# Patient Record
Sex: MALE | Race: WHITE | NOT HISPANIC OR LATINO | Employment: OTHER | URBAN - METROPOLITAN AREA
[De-identification: names, ages, dates, MRNs, and addresses within clinical notes are randomized per-mention and may not be internally consistent; named-entity substitution may affect disease eponyms.]

---

## 2017-04-21 ENCOUNTER — TRANSCRIBE ORDERS (OUTPATIENT)
Dept: LAB | Facility: HOSPITAL | Age: 82
End: 2017-04-21

## 2017-10-20 ENCOUNTER — LAB REQUISITION (OUTPATIENT)
Dept: LAB | Facility: HOSPITAL | Age: 82
End: 2017-10-20

## 2017-10-20 DIAGNOSIS — Z00.00 ROUTINE GENERAL MEDICAL EXAMINATION AT A HEALTH CARE FACILITY: ICD-10-CM

## 2018-02-07 ENCOUNTER — HOSPITAL ENCOUNTER (OUTPATIENT)
Facility: HOSPITAL | Age: 83
Setting detail: OBSERVATION
Discharge: HOME | End: 2018-02-09
Attending: INTERNAL MEDICINE | Admitting: INTERNAL MEDICINE

## 2018-02-07 DIAGNOSIS — I48.0 PAF (PAROXYSMAL ATRIAL FIBRILLATION) (HCC): Primary | ICD-10-CM

## 2018-02-07 LAB — GLUCOSE BLDC GLUCOMTR-MCNC: 162 MG/DL (ref 70–130)

## 2018-02-07 PROCEDURE — A9270 NON-COVERED ITEM OR SERVICE: HCPCS | Performed by: NURSE PRACTITIONER

## 2018-02-07 PROCEDURE — 99024 POSTOP FOLLOW-UP VISIT: CPT | Performed by: INTERNAL MEDICINE

## 2018-02-07 PROCEDURE — 63710000001 APIXABAN 5 MG TABLET: Performed by: NURSE PRACTITIONER

## 2018-02-07 PROCEDURE — 93005 ELECTROCARDIOGRAM TRACING: CPT | Performed by: INTERNAL MEDICINE

## 2018-02-07 PROCEDURE — 93010 ELECTROCARDIOGRAM REPORT: CPT | Performed by: INTERNAL MEDICINE

## 2018-02-07 PROCEDURE — 63710000001 ATORVASTATIN 10 MG TABLET: Performed by: NURSE PRACTITIONER

## 2018-02-07 PROCEDURE — G0378 HOSPITAL OBSERVATION PER HR: HCPCS

## 2018-02-07 PROCEDURE — 63710000001 TAMSULOSIN 0.4 MG CAPSULE: Performed by: NURSE PRACTITIONER

## 2018-02-07 PROCEDURE — 63710000001 METOPROLOL TARTRATE 25 MG TABLET: Performed by: NURSE PRACTITIONER

## 2018-02-07 PROCEDURE — 82962 GLUCOSE BLOOD TEST: CPT

## 2018-02-07 RX ORDER — OMEGA-3 FATTY ACIDS/FISH OIL 300-1000MG
1000 CAPSULE ORAL DAILY
COMMUNITY

## 2018-02-07 RX ORDER — DILTIAZEM HYDROCHLORIDE 240 MG/1
240 CAPSULE, COATED, EXTENDED RELEASE ORAL DAILY
Status: DISCONTINUED | OUTPATIENT
Start: 2018-02-08 | End: 2018-02-08

## 2018-02-07 RX ORDER — TAMSULOSIN HYDROCHLORIDE 0.4 MG/1
0.4 CAPSULE ORAL NIGHTLY
Status: DISCONTINUED | OUTPATIENT
Start: 2018-02-07 | End: 2018-02-09 | Stop reason: HOSPADM

## 2018-02-07 RX ORDER — OMEPRAZOLE 20 MG/1
20 CAPSULE, DELAYED RELEASE ORAL DAILY
COMMUNITY

## 2018-02-07 RX ORDER — PIOGLITAZONEHYDROCHLORIDE 15 MG/1
15 TABLET ORAL DAILY
COMMUNITY
End: 2018-06-01 | Stop reason: ALTCHOICE

## 2018-02-07 RX ORDER — PANTOPRAZOLE SODIUM 40 MG/1
40 TABLET, DELAYED RELEASE ORAL EVERY MORNING
Status: DISCONTINUED | OUTPATIENT
Start: 2018-02-08 | End: 2018-02-09 | Stop reason: HOSPADM

## 2018-02-07 RX ORDER — ATORVASTATIN CALCIUM 10 MG/1
10 TABLET, FILM COATED ORAL NIGHTLY
Status: DISCONTINUED | OUTPATIENT
Start: 2018-02-07 | End: 2018-02-09 | Stop reason: HOSPADM

## 2018-02-07 RX ORDER — ASPIRIN 81 MG/1
81 TABLET ORAL DAILY
COMMUNITY

## 2018-02-07 RX ORDER — EZETIMIBE 10 MG/1
5 TABLET ORAL DAILY
Status: ON HOLD | COMMUNITY
End: 2018-02-07

## 2018-02-07 RX ORDER — NITROGLYCERIN 0.4 MG/1
0.4 TABLET SUBLINGUAL
COMMUNITY

## 2018-02-07 RX ORDER — SIMVASTATIN 20 MG
20 TABLET ORAL NIGHTLY
COMMUNITY

## 2018-02-07 RX ORDER — NITROGLYCERIN 0.4 MG/1
0.4 TABLET SUBLINGUAL
Status: DISCONTINUED | OUTPATIENT
Start: 2018-02-07 | End: 2018-02-09 | Stop reason: HOSPADM

## 2018-02-07 RX ORDER — LOSARTAN POTASSIUM 50 MG/1
50 TABLET ORAL DAILY
Status: ON HOLD | COMMUNITY
End: 2018-02-07

## 2018-02-07 RX ORDER — LANOLIN ALCOHOL/MO/W.PET/CERES
1000 CREAM (GRAM) TOPICAL DAILY
Status: DISCONTINUED | OUTPATIENT
Start: 2018-02-08 | End: 2018-02-09 | Stop reason: HOSPADM

## 2018-02-07 RX ORDER — LOSARTAN POTASSIUM 50 MG/1
50 TABLET ORAL
Status: DISCONTINUED | OUTPATIENT
Start: 2018-02-08 | End: 2018-02-09

## 2018-02-07 RX ORDER — TAMSULOSIN HYDROCHLORIDE 0.4 MG/1
1 CAPSULE ORAL NIGHTLY
COMMUNITY

## 2018-02-07 RX ORDER — ASPIRIN 81 MG/1
81 TABLET ORAL DAILY
Status: DISCONTINUED | OUTPATIENT
Start: 2018-02-08 | End: 2018-02-09 | Stop reason: HOSPADM

## 2018-02-07 RX ORDER — LANOLIN ALCOHOL/MO/W.PET/CERES
1000 CREAM (GRAM) TOPICAL DAILY
COMMUNITY

## 2018-02-07 RX ORDER — INSULIN GLARGINE 100 [IU]/ML
50 INJECTION, SOLUTION SUBCUTANEOUS NIGHTLY
COMMUNITY

## 2018-02-07 RX ORDER — DILTIAZEM HYDROCHLORIDE 240 MG/1
240 CAPSULE, COATED, EXTENDED RELEASE ORAL DAILY
COMMUNITY
End: 2018-02-09 | Stop reason: HOSPADM

## 2018-02-07 RX ORDER — DILTIAZEM HCL IN NACL,ISO-OSM 125 MG/125
5-15 PLASTIC BAG, INJECTION (ML) INTRAVENOUS
Status: DISCONTINUED | OUTPATIENT
Start: 2018-02-07 | End: 2018-02-08

## 2018-02-07 RX ADMIN — METOPROLOL TARTRATE 25 MG: 25 TABLET ORAL at 20:42

## 2018-02-07 RX ADMIN — ATORVASTATIN CALCIUM 10 MG: 10 TABLET, FILM COATED ORAL at 20:41

## 2018-02-07 RX ADMIN — APIXABAN 5 MG: 5 TABLET, FILM COATED ORAL at 20:41

## 2018-02-07 RX ADMIN — TAMSULOSIN HYDROCHLORIDE 0.4 MG: 0.4 CAPSULE ORAL at 20:42

## 2018-02-07 NOTE — H&P
Juan Coe  [id number]  1935   LOS: 0 days   Patient Care Team:  INTERNIST: Connor Rico MD   ENDOCRINOLOGIST: Thony Stallworth M.D.  CARDIOTHORACIC SURGEON: Omar Valentino M.D., FACS  COLORECTAL SURGEON: Nate Faye MD    Mr. Coe is an 82-year-old  white male from [place], [place]    Chief Complaint:  Paroxysmal atrial fibrillation    Problem List:  1. Paroxysmal atrial fibrillation/palpitations  A. Remote SVT with adenosine administration approximately 1994-data deficit  B. Defibrillator shock required during left heart catheterization 2005  C. Atrial fibrillation at time of MI 1/22/18, patient started on Eliquis, CHADsVasc 4  2. Ischemic heart disease  A.  MI approximately 2005, with left heart catheterization, followed by CABG ×6 vessels-data deficit  B. Apparent defibrillator shock during left heart catheterization in 2005  C. NSTEMI 1/22/18 at [facility] with left heart catheterization 1/22/18: CAD with occluded left main and right coronary artery at the ostium, LIMA graft to the LAD and diagonal is patent, saphenous yohan graft to the obtuse marginal and diagonal 2 is patent, saphenous vein graft to the distal LAD occluded, with discharge with medical therapy  D. Residual Class I symptoms  3. Hypertension  4. Hyperlipidemia  5. Type 2 diabetes mellitus  6. GERD  7. History of colon cancer  8. Surgical history  A. CABG ×6 vessels 2005  B. Right hip replacement  C. Colon resection  D. Cholecystectomy  E. Cataract extractions    Allergies   Allergen Reactions   • Phenergan [Promethazine Hcl] Delirium     Prescriptions Prior to Admission   Medication Sig Dispense Refill Last Dose   • apixaban (ELIQUIS) 5 MG tablet tablet Take 5 mg by mouth Every 12 (Twelve) Hours.      • aspirin 81 MG EC tablet Take 81 mg by mouth Daily.      • diltiaZEM CD (CARDIZEM CD) 240 MG 24 hr capsule Take 240 mg by mouth Daily.      • EZETIMIBE PO Take 5 mg by mouth  Daily.      • insulin glargine (LANTUS) 100 UNIT/ML injection Inject 75 Units under the skin Every Night.      • LOSARTAN POTASSIUM PO Take 50 mg by mouth.      • metFORMIN (GLUCOPHAGE) 1000 MG tablet Take 1,000 mg by mouth 2 (Two) Times a Day With Meals.      • metoprolol tartrate (LOPRESSOR) 25 MG tablet Take 25 mg by mouth Every 12 (Twelve) Hours.      • nitroglycerin (NITROSTAT) 0.4 MG SL tablet Place 0.4 mg under the tongue Every 5 (Five) Minutes As Needed for Chest Pain. Take no more than 3 doses in 15 minutes.      • Omega 3 1000 MG capsule Take 1 g by mouth.      • omeprazole (priLOSEC) 20 MG capsule Take 20 mg by mouth Daily.      • pioglitazone (ACTOS) 15 MG tablet Take 15 mg by mouth Daily.      • simvastatin (ZOCOR) 20 MG tablet Take 20 mg by mouth Every Night.      • tamsulosin (FLOMAX) 0.4 MG capsule 24 hr capsule Take 1 capsule by mouth Every Night.      • vitamin B-12 (CYANOCOBALAMIN) 1000 MCG tablet Take 1,000 mcg by mouth Daily.             History of Present Illness:   This is an 82-year-old white male who presents with paroxysmal atrial fibrillation / flutter.  He is asymptomatic with atrial fibrillation.  He denies any chest pain, palpitations, shortness of breath, fatigue, presyncope, syncope, CVAs, TIAs, seizures, DVTs, PEs.  He has known hypertension, hyperlipidemia, type 2 diabetes mellitus.  Remotely he had an episode of possible SVT in 1994 treated with adenosine administration.  He has had 2 heart catheterizations, one in 2005, and most recently 1/22/18.  The heart catheterization in 2005 he apparently went into V. fib and had to be defibrillated during his heart catheterization.  He had a CABG ×6 vessels in 2005 apparently performed by Dr. Omar Valentino; data deficit.  He has never worn a heart monitor in the past.  Since his heart attack on 1/22/18, he has had a couple episodes of atrial fibrillation.  The patient was asymptomatic but the patient's wife noticed on the blood pressure  "monitor that it was saying irregular.  His heart rate has been up to the 150s.  Remotely he had colon cancer but did not have any chemotherapy or radiation.  He has a known heart murmur since birth.  His family states that part of his heart is backwards; the front is supposed to be in the back and the back in the front.  He never had to have any congenital heart surgery, and has not had rheumatic fever.  He only smoked for one year when he was a teenager.  He arrives on the telemetry floor asymptomatic; he was referred from the emergency department at [facility] for consideration of \"pulmonary vein ablation.\"    Cardiac risk factors: advanced age (older than 55 for men, 65 for women), diabetes mellitus, dyslipidemia, hypertension, male gender and obesity (BMI >= 30 kg/m2).    Social History     Social History   • Marital status:      Spouse name: N/A   • Number of children: N/A   • Years of education: N/A     Occupational History   • Not on file.     Social History Main Topics   • Smoking status: Former Smoker     Types: Cigarettes   • Smokeless tobacco: Not on file      Comment: SMOKED X 1 YEAR, 50 YEARS AGO   • Alcohol use No   • Drug use: No   • Sexual activity: Not on file     Other Topics Concern   • Not on file     Social History Narrative   • No narrative on file     No family history on file.    Review of Systems  Pertinent items are noted in HPI and problem list.     Objective:       Physical Exam  Ht 182.9 cm (72\")  Wt 94.8 kg (209 lb)  BMI 28.35 kg/m2  Last 2 weights    02/07/18  1706   Weight: 94.8 kg (209 lb)     Body mass index is 28.35 kg/(m^2).  No intake or output data in the 24 hours ending 02/07/18 1744    General Appearance:  Alert, cooperative, no distress, appears stated age   Head:  Normocephalic, without obvious abnormality, atraumatic   Eyes:  PERRL, conjunctiva/corneas clear, EOM's intact, fundi benign, both eyes   Throat: Lips, mucosa, and tongue " normal; teeth and gums normal   Neck: Supple, symmetrical, trachea midline, no adenopathy, thyroid: not enlarged, symmetric, no tenderness/mass/nodules, no carotid bruit or JVD   Lungs:   Diminished breath sounds to auscultation bilaterally, respirations unlabored   Heart:  Irregular rate and rhythm, S1, S2 normal, grade 2/6 murmur, rub or gallop   Abdomen:   Soft, non-tender, no masses, no organomegaly, bowel sounds audible x4   Extremities: No edema, normal range of motion   Pulses: 2+ and symmetric   Skin: Skin color, texture, turgor normal, no rashes or lesions   Neurologic: Normal       Cardiographics:    · EK18: Atrial flutter with variable AV block, left axis deviation, RBBB, inferior infarct, age undetermined, abnormal ECG, 91 bpm,  ms,  ms    Imaging:    · Chest x-ray: 18: No acute cardiopulmonary disease    Lab Review:     · CBC: WBC 7.14, RBC 4.67, hemoglobin 14, hematocrit 42.6, MCV 91.2, MCH 30, MCHC 32.9, RDW 14, platelets 166, MPV 11, neutrophils 67, lymphocytes 22.3, monocytes 7.4, eos 2.7, basos 0.3  · CMP: Sodium 146, potassium 4.7, chloride 109, carbon dioxide 20.7, BUN 26, creatinine 1.36, calcium 10, bilirubin 0.8, glucose 141, AST 29, ALTs 51, alkaline phosphatase 67, troponin 0.024, protein 7.3, albumin 4.4    NO DRIPS.     Assessment:   Patient with paroxysmal atrial fibrillation/flutter. We will also order an echocardiogram.  He has been anticoagulated since 18 with Eliquis. We will let Dr. Guillen decide in the morning if he wants to start sotalol versus other antiarrythmic therapy. We feel that he may be a good candidate for dofetilide with subsequent attempt at DC cardioversion.     Plan:   1. Cardizem gtt if rates are uncontrolled  2. ECG in morning  3. Echocardiogram  4. Per Dr. Guillen in a.m. concerning trial of antiarrhythmic with possible subsequent cardioversion.  5. Continue current home medications at this time.  6. Assess BNP, FLP, hemoglobin  A1c in a.m. as well as EKG    Discussed with patient, wife, and 2 family members in room; they're disappointed he is not having a pulmonary vein ablation this evening.    Scribed for Waqas Carpio MD by Susan Mcmullen, DANGELO. 2/7/2018  6:14 PM     I, Waqas Carpio MD, [facility], personally performed the services described in this documentation as scribed by the above named individual in my presence, and it is both accurate and complete.

## 2018-02-07 NOTE — NURSING NOTE
ACC REVIEW REPORT: [facility]        PATIENT NAME: Juan Coe    PATIENT ID: [id number]    BED: N621    BED TYPE: Tele    BED GIVEN TO: Milad    TIME BED GIVEN: 1444    YOB: 1935    AGE: 83 yo    GENDER: Female    PREVIOUS ADMIT TO [facility]:     PREVIOUS ADMISSION DATE:     PATIENT CLASS:     TODAY'S DATE: 2/7/2018    TRANSFER DATE: 2/7/2018    ETA:     TRANSFERRING FACILITY: [facility]    TRANSFERRING FACILITY PHONE # : [phone]    TRANSFERRING MD:     DATE/TIME REQUEST RECEIVED: 2/7/2018 at 1431    [facility] RN: Kitty Xiao RN     REPORT FROM: Milad Lujan RN    TIME REPORT TAKEN: 1501    DIAGNOSIS: Afib/flutter    REASON FOR TRANSFER TO [facility]: Higher Level of are    TRANSPORTATION: Ambulance    CLINICAL REASON FOR TRANSFER TO [facility]: Patient presented to local ED complaining of left neck and shoulder pain that he said was similar to the pain he felt with the NSTEMI he had last week. He was scheduled to have a EP study with ablation next week with [facility] Cardiologist. He presents today in rapid afib/flutter with a heart rate in the 150-160's. Transferring to [facility] for further evaluation.      CLINICAL INFORMATION    HEIGHT:     WEIGHT:     ALLERGIES: Promethazine    JULIEN:     INFECTIOUS DISEASE: None    ISOLATION:     1ST VITAL SIGNS:   TIME:   TEMP:   PULSE:   B/P:   RESP:     LAST VITAL SIGNS:  TIME:   TEMP: 97.6  PULSE: 89  B/P: 111/77  RESP: 11    LAB INFORMATION: Na+_146, Crea-1.36, BUN-26    CULTURE INFORMATION:     MEDS/IV FLUIDS: See MAR sent with patient. Received 15 mg Cardizem. Has a #20 LAC-SL.      CARDIAC SYSTEM:    CHEST PAIN: None at time of report    RATE:     SCALE:     RHYTHM: Afib/flutter    Is patient taking or has patient been given any drugs that could increase bleeding? Yes  (Plavix, Brilinta, Effient, Eliquis, Xarelto, Warfarin, Integrilin, Angiomax)    DRUG: Eliquis     DOSE/FREQUENCY: 5 mg BID    CARDIAC ENZYMES:    DATE: 2/7/2018  TIME:   CK:   CKMB:    MAX:   TROP: 0.024    DATE:   TIME:   CK:   CKMB:   MAX:   TROP: 0.021      HEART CATH:     HEART CATH DATE:     HEART CATH RESULTS:     LAD:   RCA:   CX:   LMAIN:   EF:     SWAN:     SITE:   SIZE:    DATE INSERTED:     ARTLINE:     SITE:   SIZE:   DATE INSERTED:     SHEATH:    SITE:   SIZE:   DATE INSERTED:         VASOSEAL:    SITE:   DATE INSERTED:     EXTERNAL PACEMAKER:     RATE:   EXT PACER ON:     MODE:    DATE INSERTED:   OUTPUT SETTING:   SENSITIVITY SETTING:   SENSITIVITY TYPE:     IABP:    SITE:   AUG PRESSURE:   DATE INSERTED:     CARDIAC NOTES:       RESPIRATORY SYSTEM:    LUNG SOUNDS:    CLEAR: Yes  CRACKLES:   WHEEZES:   RHONCHI:   DIMINISHED:     ABG DATE:         ABG TIME:     ABG RESULTS:    PH:   PO2:   PCO2:   HCO3:   O2 SAT:       ETT:     ETT SIZE:     ORAL:     NASAL:     SECURED AT MEASUREMENT (CM):     ON VENTILATOR:    TV:   FI02:   RATE:   PEEP:     OXYGEN: RA    O2 SAT: 98%    ADMINISTRATION ROUTE:     IMAGING FINDINGS:     PNEUMO LOCATION:     PNEUMO SIZE:     PNEUMO CHEST TUBE SEAL TYPE:     RADIOLOGY RESULTS:     RESPIRATORY STATUS: No acute distress      CNS/MUSCULOSKELETAL    ALERT AND ORIENTED:    PERSON: Yes  PLACE: Yes  TIME: Yes    INJURY:  WHERE:     HANNY COMA SCALE: 15    E:   M:   V:     STROKE SCALE:     SIZE OF HEMORRHAGE:     SYMPTOMS: (CHOOSE APPROPRIATE)    ASPHASIA:   ATAXIA:   DYSARTHRIA:   DYSPHASIA:   HEADACHE:   PARALYSIS:   SEIZURE:   SYNCOPE:   VERTIGO:   VISION CHANGE:        EXTREMITY WEAKNESS:    LEFT ARM:   RIGHT ARM:   LEFT LEG:   RIGHT LEG:     CAT SCAN RESULTS:     MRI RESULTS:     CNS/MUSCULOSKELETAL NOTES: Patient is ambulatory      GI//GY      ABDOMINAL PAIN:     VOMITING:     DIARRHEA:     NAUSEA:     BOWEL SOUNDS:     OCCULT STOOL:     VAGINAL BLEEDING:     TESTICULAR PAIN:     HEMATURIA:     NG TUBE:    SIZE:   DATE INSERTED:       ULTRASOUND:     ULTRASOUND RESULTS:       ACUTE ABDOMEN:     ACUTE ABDOMEN RESULTS:       CT SCAN:     CT SCAN  RESULTS:       GI//GY NOTES:     PAST MEDICAL HISTORY: CAD, Afib, hyperlipidemia, HTN    OTHER SYMPTOM NOTES:     ADDITIONAL NOTES:           Kitty Xiao RN  2/7/2018  3:08 PM

## 2018-02-07 NOTE — PLAN OF CARE
Problem: Patient Care Overview (Adult)  Goal: Plan of Care Review  Outcome: Ongoing (interventions implemented as appropriate)   02/07/18 0260   Coping/Psychosocial Response Interventions   Plan Of Care Reviewed With patient;family   Patient Care Overview   Progress improving   Outcome Evaluation   Outcome Summary/Follow up Plan Patient admitted to [facility] ED for persistent Atrial Flutter w/ RVR. Patient was scheduled for appointment next Thursday, February 14, 2018 with Dr. Guillen for possible PVA however per report patient has had 3 episodes of A-Fib/Flutter w/RVR involving trips to ED for assistance with rate control/conversion. Today's episode involved neck and shoulder pain along with weakness and sensations of indigestion. Patient reported taking one dose of NTG without symptom relief therefore he sought treatment in the ED. He received 15 mg Diltiazem bolus in ED with decreased rate from 120-150s to 90-110s. Upon arrival to  patient was still in Atrial Flutter, rate varying between  as of 1745.        Problem: Arrhythmia/Dysrhythmia (Symptomatic) (Adult)  Goal: Signs and Symptoms of Listed Potential Problems Will be Absent or Manageable (Arrhythmia/Dysrhythmia)  Outcome: Ongoing (interventions implemented as appropriate)

## 2018-02-08 ENCOUNTER — APPOINTMENT (OUTPATIENT)
Dept: CARDIOLOGY | Facility: HOSPITAL | Age: 83
End: 2018-02-08

## 2018-02-08 PROBLEM — E78.5 HYPERLIPIDEMIA: Status: ACTIVE | Noted: 2018-02-08

## 2018-02-08 PROBLEM — I48.0 PAF (PAROXYSMAL ATRIAL FIBRILLATION) (HCC): Status: ACTIVE | Noted: 2018-02-08

## 2018-02-08 PROBLEM — I10 HTN (HYPERTENSION): Status: ACTIVE | Noted: 2018-02-08

## 2018-02-08 PROBLEM — E11.9 DIABETES MELLITUS (HCC): Status: ACTIVE | Noted: 2018-02-08

## 2018-02-08 PROBLEM — I25.10 CORONARY ARTERY DISEASE: Status: ACTIVE | Noted: 2018-02-08

## 2018-02-08 LAB
ANION GAP SERPL CALCULATED.3IONS-SCNC: 8 MMOL/L (ref 3–11)
ARTICHOKE IGE QN: 79 MG/DL (ref 0–130)
BH CV ECHO MEAS - AO MAX PG (FULL): 23.1 MMHG
BH CV ECHO MEAS - AO MAX PG: 26 MMHG
BH CV ECHO MEAS - AO MEAN PG (FULL): 12.9 MMHG
BH CV ECHO MEAS - AO MEAN PG: 14.3 MMHG
BH CV ECHO MEAS - AO ROOT AREA (BSA CORRECTED): 1.8
BH CV ECHO MEAS - AO ROOT AREA: 12.6 CM^2
BH CV ECHO MEAS - AO ROOT DIAM: 4 CM
BH CV ECHO MEAS - AO V2 MAX: 251.9 CM/SEC
BH CV ECHO MEAS - AO V2 MEAN: 177.9 CM/SEC
BH CV ECHO MEAS - AO V2 VTI: 52.1 CM
BH CV ECHO MEAS - AVA(I,A): 1.4 CM^2
BH CV ECHO MEAS - AVA(I,D): 1.4 CM^2
BH CV ECHO MEAS - AVA(V,A): 1.6 CM^2
BH CV ECHO MEAS - AVA(V,D): 1.6 CM^2
BH CV ECHO MEAS - BSA(HAYCOCK): 2.2 M^2
BH CV ECHO MEAS - BSA: 2.2 M^2
BH CV ECHO MEAS - BZI_BMI: 28.3 KILOGRAMS/M^2
BH CV ECHO MEAS - BZI_METRIC_HEIGHT: 182.9 CM
BH CV ECHO MEAS - BZI_METRIC_WEIGHT: 94.8 KG
BH CV ECHO MEAS - CI(CUBED): 3 L/MIN/M^2
BH CV ECHO MEAS - CI(MOD-SP2): 0.85 L/MIN/M^2
BH CV ECHO MEAS - CI(MOD-SP4): 2.4 L/MIN/M^2
BH CV ECHO MEAS - CI(TEICH): 2.5 L/MIN/M^2
BH CV ECHO MEAS - CO(CUBED): 6.6 L/MIN
BH CV ECHO MEAS - CO(MOD-SP2): 1.8 L/MIN
BH CV ECHO MEAS - CO(MOD-SP4): 5.3 L/MIN
BH CV ECHO MEAS - CO(TEICH): 5.5 L/MIN
BH CV ECHO MEAS - CONTRAST EF (2CH): 46.2 ML/M^2
BH CV ECHO MEAS - CONTRAST EF 4CH: 63.4 ML/M^2
BH CV ECHO MEAS - EDV(CUBED): 111.4 ML
BH CV ECHO MEAS - EDV(MOD-SP2): 39 ML
BH CV ECHO MEAS - EDV(MOD-SP4): 82 ML
BH CV ECHO MEAS - EDV(TEICH): 108.1 ML
BH CV ECHO MEAS - EF(CUBED): 58.3 %
BH CV ECHO MEAS - EF(MOD-SP2): 46.2 %
BH CV ECHO MEAS - EF(MOD-SP4): 63.4 %
BH CV ECHO MEAS - EF(TEICH): 49.8 %
BH CV ECHO MEAS - ESV(CUBED): 46.5 ML
BH CV ECHO MEAS - ESV(MOD-SP2): 21 ML
BH CV ECHO MEAS - ESV(MOD-SP4): 30 ML
BH CV ECHO MEAS - ESV(TEICH): 54.3 ML
BH CV ECHO MEAS - FS: 25.3 %
BH CV ECHO MEAS - IVS/LVPW: 1.2
BH CV ECHO MEAS - IVSD: 1.1 CM
BH CV ECHO MEAS - LA DIMENSION: 4 CM
BH CV ECHO MEAS - LA/AO: 0.99
BH CV ECHO MEAS - LAT PEAK E' VEL: 6.2 CM/SEC
BH CV ECHO MEAS - LV DIASTOLIC VOL/BSA (35-75): 37.8 ML/M^2
BH CV ECHO MEAS - LV MASS(C)D: 178.7 GRAMS
BH CV ECHO MEAS - LV MASS(C)DI: 82.3 GRAMS/M^2
BH CV ECHO MEAS - LV MAX PG: 2.9 MMHG
BH CV ECHO MEAS - LV MEAN PG: 1.4 MMHG
BH CV ECHO MEAS - LV SYSTOLIC VOL/BSA (12-30): 13.8 ML/M^2
BH CV ECHO MEAS - LV V1 MAX: 84.8 CM/SEC
BH CV ECHO MEAS - LV V1 MEAN: 53.1 CM/SEC
BH CV ECHO MEAS - LV V1 VTI: 15.7 CM
BH CV ECHO MEAS - LVIDD: 4.8 CM
BH CV ECHO MEAS - LVIDS: 3.6 CM
BH CV ECHO MEAS - LVLD AP2: 7.4 CM
BH CV ECHO MEAS - LVLD AP4: 7.8 CM
BH CV ECHO MEAS - LVLS AP2: 6.8 CM
BH CV ECHO MEAS - LVLS AP4: 6.8 CM
BH CV ECHO MEAS - LVOT AREA (M): 4.5 CM^2
BH CV ECHO MEAS - LVOT AREA: 4.6 CM^2
BH CV ECHO MEAS - LVOT DIAM: 2.4 CM
BH CV ECHO MEAS - LVPWD: 0.94 CM
BH CV ECHO MEAS - MED PEAK E' VEL: 4.89 CM/SEC
BH CV ECHO MEAS - MM HR: 102 BPM
BH CV ECHO MEAS - MM R-R INT: 0.59 SEC
BH CV ECHO MEAS - MV A MAX VEL: 57.8 CM/SEC
BH CV ECHO MEAS - MV DEC TIME: 0.17 SEC
BH CV ECHO MEAS - MV E MAX VEL: 96 CM/SEC
BH CV ECHO MEAS - MV E/A: 1.7
BH CV ECHO MEAS - PA ACC SLOPE: 792.1 CM/SEC^2
BH CV ECHO MEAS - PA ACC TIME: 0.09 SEC
BH CV ECHO MEAS - PA PR(ACCEL): 39.8 MMHG
BH CV ECHO MEAS - RVDD: 3.9 CM
BH CV ECHO MEAS - SI(AO): 303 ML/M^2
BH CV ECHO MEAS - SI(CUBED): 29.9 ML/M^2
BH CV ECHO MEAS - SI(LVOT): 33.3 ML/M^2
BH CV ECHO MEAS - SI(MOD-SP2): 8.3 ML/M^2
BH CV ECHO MEAS - SI(MOD-SP4): 24 ML/M^2
BH CV ECHO MEAS - SI(TEICH): 24.8 ML/M^2
BH CV ECHO MEAS - SV(AO): 657.8 ML
BH CV ECHO MEAS - SV(CUBED): 64.9 ML
BH CV ECHO MEAS - SV(LVOT): 72.3 ML
BH CV ECHO MEAS - SV(MOD-SP2): 18 ML
BH CV ECHO MEAS - SV(MOD-SP4): 52 ML
BH CV ECHO MEAS - SV(TEICH): 53.8 ML
BH CV ECHO MEAS - TAPSE (>1.6): 1.2 CM2
BH CV ECHO MEAS - TR MAX VEL: 214.6 CM/SEC
BH CV XLRA - RV BASE: 4.1 CM
BH CV XLRA - RV LENGTH: 7.3 CM
BH CV XLRA - RV MID: 3.5 CM
BH CV XLRA - TDI S': 8.06 CM/SEC
BNP SERPL-MCNC: 56 PG/ML (ref 0–100)
BUN BLD-MCNC: 28 MG/DL (ref 9–23)
BUN/CREAT SERPL: 20 (ref 7–25)
CALCIUM SPEC-SCNC: 9.1 MG/DL (ref 8.7–10.4)
CHLORIDE SERPL-SCNC: 108 MMOL/L (ref 99–109)
CHOLEST SERPL-MCNC: 124 MG/DL (ref 0–200)
CO2 SERPL-SCNC: 21 MMOL/L (ref 20–31)
CREAT BLD-MCNC: 1.4 MG/DL (ref 0.6–1.3)
E/E' RATIO: 17.5
GFR SERPL CREATININE-BSD FRML MDRD: 49 ML/MIN/1.73
GLUCOSE BLD-MCNC: 105 MG/DL (ref 70–100)
GLUCOSE BLDC GLUCOMTR-MCNC: 116 MG/DL (ref 70–130)
GLUCOSE BLDC GLUCOMTR-MCNC: 128 MG/DL (ref 70–130)
GLUCOSE BLDC GLUCOMTR-MCNC: 152 MG/DL (ref 70–130)
GLUCOSE BLDC GLUCOMTR-MCNC: 153 MG/DL (ref 70–130)
HBA1C MFR BLD: 7 % (ref 4.8–5.6)
HDLC SERPL-MCNC: 33 MG/DL (ref 40–60)
LEFT ATRIUM VOLUME INDEX: 26.3 ML/M2
LV EF 2D ECHO EST: 65 %
MAGNESIUM SERPL-MCNC: 2 MG/DL (ref 1.3–2.7)
MAXIMAL PREDICTED HEART RATE: 140 BPM
POTASSIUM BLD-SCNC: 4.4 MMOL/L (ref 3.5–5.5)
SODIUM BLD-SCNC: 137 MMOL/L (ref 132–146)
STRESS TARGET HR: 119 BPM
TRIGL SERPL-MCNC: 128 MG/DL (ref 0–150)

## 2018-02-08 PROCEDURE — A9270 NON-COVERED ITEM OR SERVICE: HCPCS | Performed by: NURSE PRACTITIONER

## 2018-02-08 PROCEDURE — G0378 HOSPITAL OBSERVATION PER HR: HCPCS

## 2018-02-08 PROCEDURE — 25010000002 MIDAZOLAM PER 1 MG: Performed by: INTERNAL MEDICINE

## 2018-02-08 PROCEDURE — 25010000002 HEPARIN (PORCINE) PER 1000 UNITS: Performed by: INTERNAL MEDICINE

## 2018-02-08 PROCEDURE — 93653 COMPRE EP EVAL TX SVT: CPT | Performed by: INTERNAL MEDICINE

## 2018-02-08 PROCEDURE — 63710000001 INSULIN DETEMIR PER 5 UNITS: Performed by: NURSE PRACTITIONER

## 2018-02-08 PROCEDURE — 63710000001 ASPIRIN 81 MG TABLET DELAYED-RELEASE: Performed by: NURSE PRACTITIONER

## 2018-02-08 PROCEDURE — 63710000001 TAMSULOSIN 0.4 MG CAPSULE: Performed by: NURSE PRACTITIONER

## 2018-02-08 PROCEDURE — 83880 ASSAY OF NATRIURETIC PEPTIDE: CPT | Performed by: INTERNAL MEDICINE

## 2018-02-08 PROCEDURE — A9270 NON-COVERED ITEM OR SERVICE: HCPCS | Performed by: INTERNAL MEDICINE

## 2018-02-08 PROCEDURE — 93306 TTE W/DOPPLER COMPLETE: CPT

## 2018-02-08 PROCEDURE — 25010000002 CALCIUM GLUCONATE PER 10 ML: Performed by: INTERNAL MEDICINE

## 2018-02-08 PROCEDURE — 63710000001 METFORMIN 1000 MG TABLET: Performed by: NURSE PRACTITIONER

## 2018-02-08 PROCEDURE — 93621 COMP EP EVL L PAC&REC C SINS: CPT | Performed by: INTERNAL MEDICINE

## 2018-02-08 PROCEDURE — C1730 CATH, EP, 19 OR FEW ELECT: HCPCS | Performed by: INTERNAL MEDICINE

## 2018-02-08 PROCEDURE — 93623 PRGRMD STIMJ&PACG IV RX NFS: CPT | Performed by: INTERNAL MEDICINE

## 2018-02-08 PROCEDURE — 63710000001 APIXABAN 5 MG TABLET: Performed by: NURSE PRACTITIONER

## 2018-02-08 PROCEDURE — C1894 INTRO/SHEATH, NON-LASER: HCPCS | Performed by: INTERNAL MEDICINE

## 2018-02-08 PROCEDURE — 63710000001 ATORVASTATIN 10 MG TABLET: Performed by: NURSE PRACTITIONER

## 2018-02-08 PROCEDURE — 63710000001 INSULIN LISPRO (HUMAN) PER 5 UNITS: Performed by: NURSE PRACTITIONER

## 2018-02-08 PROCEDURE — 99219 PR INITIAL OBSERVATION CARE/DAY 50 MINUTES: CPT | Performed by: NURSE PRACTITIONER

## 2018-02-08 PROCEDURE — 63710000001 LOSARTAN 50 MG TABLET: Performed by: NURSE PRACTITIONER

## 2018-02-08 PROCEDURE — 93005 ELECTROCARDIOGRAM TRACING: CPT | Performed by: INTERNAL MEDICINE

## 2018-02-08 PROCEDURE — 25010000002 GLUCAGON (RDNA) PER 1 MG: Performed by: INTERNAL MEDICINE

## 2018-02-08 PROCEDURE — 82962 GLUCOSE BLOOD TEST: CPT

## 2018-02-08 PROCEDURE — 93306 TTE W/DOPPLER COMPLETE: CPT | Performed by: INTERNAL MEDICINE

## 2018-02-08 PROCEDURE — 93613 INTRACARDIAC EPHYS 3D MAPG: CPT | Performed by: INTERNAL MEDICINE

## 2018-02-08 PROCEDURE — 83036 HEMOGLOBIN GLYCOSYLATED A1C: CPT | Performed by: INTERNAL MEDICINE

## 2018-02-08 PROCEDURE — 63710000001 VITAMIN B-12 1000 MCG TABLET: Performed by: NURSE PRACTITIONER

## 2018-02-08 PROCEDURE — C1759 CATH, INTRA ECHOCARDIOGRAPHY: HCPCS | Performed by: INTERNAL MEDICINE

## 2018-02-08 PROCEDURE — 93662 INTRACARDIAC ECG (ICE): CPT | Performed by: INTERNAL MEDICINE

## 2018-02-08 PROCEDURE — 63710000001 PANTOPRAZOLE 40 MG TABLET DELAYED-RELEASE: Performed by: NURSE PRACTITIONER

## 2018-02-08 PROCEDURE — 80048 BASIC METABOLIC PNL TOTAL CA: CPT | Performed by: INTERNAL MEDICINE

## 2018-02-08 PROCEDURE — G0108 DIAB MANAGE TRN  PER INDIV: HCPCS

## 2018-02-08 PROCEDURE — 25010000002 ONDANSETRON PER 1 MG: Performed by: INTERNAL MEDICINE

## 2018-02-08 PROCEDURE — 80061 LIPID PANEL: CPT | Performed by: INTERNAL MEDICINE

## 2018-02-08 PROCEDURE — 63710000001 DILTIAZEM CD 240 MG CAPSULE SUSTAINED-RELEASE 24 HR: Performed by: NURSE PRACTITIONER

## 2018-02-08 PROCEDURE — C1732 CATH, EP, DIAG/ABL, 3D/VECT: HCPCS | Performed by: INTERNAL MEDICINE

## 2018-02-08 PROCEDURE — 25010000002 FENTANYL CITRATE (PF) 100 MCG/2ML SOLUTION: Performed by: INTERNAL MEDICINE

## 2018-02-08 PROCEDURE — 63710000001 ACETAMINOPHEN 325 MG TABLET: Performed by: INTERNAL MEDICINE

## 2018-02-08 PROCEDURE — 83735 ASSAY OF MAGNESIUM: CPT | Performed by: INTERNAL MEDICINE

## 2018-02-08 PROCEDURE — 63710000001 METOPROLOL TARTRATE 25 MG TABLET: Performed by: NURSE PRACTITIONER

## 2018-02-08 RX ORDER — ACETAMINOPHEN 650 MG/1
650 SUPPOSITORY RECTAL EVERY 4 HOURS PRN
Status: DISCONTINUED | OUTPATIENT
Start: 2018-02-08 | End: 2018-02-09 | Stop reason: HOSPADM

## 2018-02-08 RX ORDER — ONDANSETRON 2 MG/ML
INJECTION INTRAMUSCULAR; INTRAVENOUS AS NEEDED
Status: DISCONTINUED | OUTPATIENT
Start: 2018-02-08 | End: 2018-02-08 | Stop reason: HOSPADM

## 2018-02-08 RX ORDER — MIDAZOLAM HYDROCHLORIDE 1 MG/ML
INJECTION INTRAMUSCULAR; INTRAVENOUS AS NEEDED
Status: DISCONTINUED | OUTPATIENT
Start: 2018-02-08 | End: 2018-02-08 | Stop reason: HOSPADM

## 2018-02-08 RX ORDER — LIDOCAINE HYDROCHLORIDE 10 MG/ML
INJECTION, SOLUTION INFILTRATION; PERINEURAL AS NEEDED
Status: DISCONTINUED | OUTPATIENT
Start: 2018-02-08 | End: 2018-02-08 | Stop reason: HOSPADM

## 2018-02-08 RX ORDER — DEXTROSE MONOHYDRATE 25 G/50ML
25 INJECTION, SOLUTION INTRAVENOUS
Status: DISCONTINUED | OUTPATIENT
Start: 2018-02-08 | End: 2018-02-09 | Stop reason: HOSPADM

## 2018-02-08 RX ORDER — ACETAMINOPHEN 325 MG/1
650 TABLET ORAL EVERY 4 HOURS PRN
Status: DISCONTINUED | OUTPATIENT
Start: 2018-02-08 | End: 2018-02-09 | Stop reason: HOSPADM

## 2018-02-08 RX ORDER — ACETAMINOPHEN 160 MG/5ML
650 SOLUTION ORAL EVERY 4 HOURS PRN
Status: DISCONTINUED | OUTPATIENT
Start: 2018-02-08 | End: 2018-02-09 | Stop reason: HOSPADM

## 2018-02-08 RX ORDER — HYDROCODONE BITARTRATE AND ACETAMINOPHEN 5; 325 MG/1; MG/1
1 TABLET ORAL EVERY 4 HOURS PRN
Status: DISCONTINUED | OUTPATIENT
Start: 2018-02-08 | End: 2018-02-09 | Stop reason: HOSPADM

## 2018-02-08 RX ORDER — ONDANSETRON 2 MG/ML
4 INJECTION INTRAMUSCULAR; INTRAVENOUS EVERY 6 HOURS PRN
Status: DISCONTINUED | OUTPATIENT
Start: 2018-02-08 | End: 2018-02-09 | Stop reason: HOSPADM

## 2018-02-08 RX ORDER — CALCIUM GLUCONATE 94 MG/ML
1 INJECTION, SOLUTION INTRAVENOUS ONCE
Status: DISCONTINUED | OUTPATIENT
Start: 2018-02-08 | End: 2018-02-09 | Stop reason: HOSPADM

## 2018-02-08 RX ORDER — FENTANYL CITRATE 50 UG/ML
INJECTION, SOLUTION INTRAMUSCULAR; INTRAVENOUS AS NEEDED
Status: DISCONTINUED | OUTPATIENT
Start: 2018-02-08 | End: 2018-02-08 | Stop reason: HOSPADM

## 2018-02-08 RX ORDER — NICOTINE POLACRILEX 4 MG
15 LOZENGE BUCCAL
Status: DISCONTINUED | OUTPATIENT
Start: 2018-02-08 | End: 2018-02-09 | Stop reason: HOSPADM

## 2018-02-08 RX ORDER — SODIUM CHLORIDE 9 MG/ML
INJECTION, SOLUTION INTRAVENOUS CONTINUOUS PRN
Status: DISCONTINUED | OUTPATIENT
Start: 2018-02-08 | End: 2018-02-08 | Stop reason: HOSPADM

## 2018-02-08 RX ORDER — CALCIUM GLUCONATE 94 MG/ML
INJECTION, SOLUTION INTRAVENOUS AS NEEDED
Status: DISCONTINUED | OUTPATIENT
Start: 2018-02-08 | End: 2018-02-08 | Stop reason: HOSPADM

## 2018-02-08 RX ADMIN — PANTOPRAZOLE SODIUM 40 MG: 40 TABLET, DELAYED RELEASE ORAL at 05:49

## 2018-02-08 RX ADMIN — APIXABAN 5 MG: 5 TABLET, FILM COATED ORAL at 08:54

## 2018-02-08 RX ADMIN — GLUCAGON HYDROCHLORIDE 1 MG: KIT at 17:49

## 2018-02-08 RX ADMIN — PANTOPRAZOLE SODIUM 40 MG: 40 TABLET, DELAYED RELEASE ORAL at 08:54

## 2018-02-08 RX ADMIN — DILTIAZEM HYDROCHLORIDE 240 MG: 240 CAPSULE, COATED, EXTENDED RELEASE ORAL at 08:54

## 2018-02-08 RX ADMIN — METOPROLOL TARTRATE 25 MG: 25 TABLET ORAL at 08:54

## 2018-02-08 RX ADMIN — ATORVASTATIN CALCIUM 10 MG: 10 TABLET, FILM COATED ORAL at 22:13

## 2018-02-08 RX ADMIN — ASPIRIN 81 MG: 81 TABLET, COATED ORAL at 08:54

## 2018-02-08 RX ADMIN — TAMSULOSIN HYDROCHLORIDE 0.4 MG: 0.4 CAPSULE ORAL at 22:13

## 2018-02-08 RX ADMIN — METFORMIN HYDROCHLORIDE 1000 MG: 1000 TABLET ORAL at 08:54

## 2018-02-08 RX ADMIN — INSULIN DETEMIR 10 UNITS: 100 INJECTION, SOLUTION SUBCUTANEOUS at 22:10

## 2018-02-08 RX ADMIN — CYANOCOBALAMIN TAB 1000 MCG 1000 MCG: 1000 TAB at 08:54

## 2018-02-08 RX ADMIN — ACETAMINOPHEN 650 MG: 325 TABLET, FILM COATED ORAL at 22:13

## 2018-02-08 RX ADMIN — APIXABAN 5 MG: 5 TABLET, FILM COATED ORAL at 22:13

## 2018-02-08 RX ADMIN — LOSARTAN POTASSIUM 50 MG: 50 TABLET ORAL at 08:54

## 2018-02-08 NOTE — PROGRESS NOTES
[facility]  Progress Note       LOS: 0 days   Patient Care Team:  Connor Rico MD as PCP - General (Internal Medicine)  No Known Provider as PCP - Family Medicine    Chief Complaint:  Atrial flutter     Subjective     The patient was admitted last night as a transfer from [facility] after presenting to the ED for rapid HR. EKG showed atrial flutter, he was started on Cardizem gtt and sent to [facility]. Here, he had been rate controlled until recently. He has never been on an antiarrythmic. He is on Metoprolol. He has been on Eliquis since three weeks ago when he was apparently found to be in atrial fibrillation at the time of a NSTEMI. He had a LHC which showed occluded  to distal LAD, but otherwise patent grafts. Echo at that time showed EF 45%.   Currently, the patient is asymptomatic with no CP or SOB. However, when his HR is high at home in the 150s, he feels poorly and has neck/shoulder pain. He denies palpitations.         Review of Systems:   Pertinent positives in HPI, all others reviewed and negative.      Objective       Current Facility-Administered Medications:   •  apixaban (ELIQUIS) tablet 5 mg, 5 mg, Oral, Q12H, Susan Mcmullen APRN, 5 mg at 18 0854  •  aspirin EC tablet 81 mg, 81 mg, Oral, Daily, Susan Mcmullen, APRN, 81 mg at 18 0854  •  atorvastatin (LIPITOR) tablet 10 mg, 10 mg, Oral, Nightly, Susan Mcmullen APRN, 10 mg at 18 2041  •  diltiaZEM (CARDIZEM) 125mg/125 mL infusion, 5-15 mg/hr, Intravenous, Titrated, Susan Mcmullen APRN, Stopped at 18 1833  •  diltiaZEM CD (CARDIZEM CD) 24 hr capsule 240 mg, 240 mg, Oral, Daily, Susan Mcmullen APRN, 240 mg at 18 0854  •  losartan (COZAAR) tablet 50 mg, 50 mg, Oral, Q24H, Susan Mcmullen APRN, 50 mg at 18 0854  •  metFORMIN (GLUCOPHAGE) tablet 1,000 mg, 1,000 mg, Oral, BID With Meals, DANGELO Jaramillo, 1,000 mg at 18 1149  •  metoprolol tartrate  "(LOPRESSOR) tablet 25 mg, 25 mg, Oral, Q12H, Susan Mcmullen APRN, 25 mg at 02/08/18 0854  •  nitroglycerin (NITROSTAT) SL tablet 0.4 mg, 0.4 mg, Sublingual, Q5 Min PRN, DANGELO Jaramillo  •  pantoprazole (PROTONIX) EC tablet 40 mg, 40 mg, Oral, QAM, Suasn Mcmullen APRN, 40 mg at 02/08/18 0854  •  tamsulosin (FLOMAX) 24 hr capsule 0.4 mg, 0.4 mg, Oral, Nightly, Susan Mcmullen APRN, 0.4 mg at 02/07/18 2042  •  vitamin B-12 (CYANOCOBALAMIN) tablet 1,000 mcg, 1,000 mcg, Oral, Daily, DANGELO Jaramillo, 1,000 mcg at 02/08/18 0854    Vital Sign Min/Max for last 24 hours  Temp  Min: 97.5 °F (36.4 °C)  Max: 98.5 °F (36.9 °C)   BP  Min: 101/67  Max: 132/75   Pulse  Min: 70  Max: 112   Resp  Min: 14  Max: 16   SpO2  Min: 94 %  Max: 95 %   No Data Recorded   Weight  Min: 94.8 kg (209 lb)  Max: 94.8 kg (209 lb)     Flowsheet Rows         First Filed Value    Admission Height  182.9 cm (72\") Documented at 02/07/2018 1706    Admission Weight  94.8 kg (209 lb) Documented at 02/07/2018 1706            Intake/Output Summary (Last 24 hours) at 02/08/18 1141  Last data filed at 02/08/18 1042   Gross per 24 hour   Intake             1080 ml   Output              450 ml   Net              630 ml       Physical Exam:     General Appearance:    Alert, cooperative, in no acute distress   Lungs:     Clear to auscultation,respirations regular, even and                  unlabored    Heart:    Tachycardic rate, irregular normal S1 and S2, +AS            murmur, no gallop, no rub, no click   Chest Wall:    No abnormalities observed   Abdomen:     Normal bowel sounds, no masses, no organomegaly, soft        non-tender, non-distended, no guarding, no rebound                tenderness   Extremities:   Moves all extremities well, no edema, no cyanosis, no             redness   Pulses:   Pulses palpable and equal bilaterally   Skin:   No bleeding, bruising or rash        Results Review:         Results from last 7 days  Lab Units " 02/08/18  0546   SODIUM mmol/L 137   POTASSIUM mmol/L 4.4   CHLORIDE mmol/L 108   CO2 mmol/L 21.0   BUN mg/dL 28*   CREATININE mg/dL 1.40*   GLUCOSE mg/dL 105*        Results from last 7 days  Lab Units 02/08/18  0546   HEMOGLOBIN A1C % 7.00*       Results from last 7 days  Lab Units 02/08/18  0546   CHOLESTEROL mg/dL 124   TRIGLYCERIDES mg/dL 128   HDL CHOL mg/dL 33*   LDL CHOL mg/dL 79                       Intake/Output Summary (Last 24 hours) at 02/08/18 1141  Last data filed at 02/08/18 1042   Gross per 24 hour   Intake             1080 ml   Output              450 ml   Net              630 ml       I personally viewed and interpreted the patient's EKG/Telemetry data    EKG: Atrial flutter (appears RA, counter clockwise) 73 bpm, with occasional PVC  All EKGs reviewed from [place] also appear to be atrial flutter.     Telemetry: atrial flutter 1:1 140 bpm    Ejection Fraction  No results found for: EF    Echo EF Estimated  No results found for: ECHOEFEST      Present on Admission:  • PAF (paroxysmal atrial fibrillation)    Assessment/Plan   1. Typical Atrial flutter - symptomatic in nature with shoulder pain/neck pain and overall poor feeling. CHADSvasc = 4 on Eliquis for 2.5 weeks. He would be a good candidate for atrial flutter ablation. Will need ICE prior to procedure to rule out RASHEED thrombus. Talked with patient and family about the procedure and they would like to proceed. If he does have atrial fibrillation as well, would recommend AAD for treatment of this such as Sotalol or possibly Tikosyn.   2. CAD - recent NSTEMI with C - medical management - on ASA, stain, BB, ARB  3. LV dysfunction - EF 45-50% on BB and ARB. Echo done today pending.   4. Renal Insufficiency - Cr 1.4     Plan for disposition: NPO now, and plan for atrial flutter ablation this afternoon.      NANCY Quiroz  02/08/18  11:41 AM        I, David Guillen MD, personally performed the services face to face as described and  documented by the above named individual. I have made any necessary edits and it is both accurate and complete 2/8/2018  5:41 PM

## 2018-02-08 NOTE — PROGRESS NOTES
Discharge Planning Assessment  [facility]     Patient Name: Juan Coe  MRN: [id number]  Today's Date: 2/8/2018    Admit Date: 2/7/2018          Discharge Needs Assessment       02/08/18 1517    Living Environment    Lives With spouse    Living Arrangements house    Home Accessibility no concerns    Type of Financial/Environmental Concern none    Transportation Available car;family or friend will provide    Living Environment    Provides Primary Care For no one    Quality Of Family Relationships supportive    Able to Return to Prior Living Arrangements yes    Discharge Needs Assessment    Concerns To Be Addressed no discharge needs identified    Anticipated Changes Related to Illness none    Equipment Currently Used at Home none    Equipment Needed After Discharge none            Discharge Plan       02/08/18 1518    Case Management/Social Work Plan    Plan Home    Patient/Family In Agreement With Plan yes    Additional Comments Spoke with pt and pt's family at bedside. Pt is still independent in ADL's and IADL's. Pt denies needing any durable medical equipment. Pt has prescription coverage with his insurance. Pt and pt's family explained that they are unsure if pt will need it but if does need HH, they would like [facility]. Pt plans to discharge home for now and denies any discharge needs at this time.     Final Note    Final Note SW is following        Discharge Placement     No information found                Demographic Summary       02/08/18 1516    Referral Information    Reason For Consult discharge planning    Primary Care Physician Information    Name Dr. Rico            Functional Status       02/08/18 1517    Functional Status Prior    Ambulation 0-->independent    Transferring 0-->independent    Toileting 0-->independent    Bathing 0-->independent    Dressing 0-->independent    Eating 0-->independent    Communication 0-->understands/communicates without difficulty    IADL     Medications independent    Meal Preparation independent    Housekeeping independent    Laundry independent    Shopping independent    Oral Care independent            Psychosocial     None            Abuse/Neglect     None            Legal     None            Substance Abuse     None            Patient Forms     None          ALCIDES Smith
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
## 2018-02-08 NOTE — CONSULTS
"Diabetes Education  Assessment/Teaching    Patient Name:  Juan Coe  YOB: 1935  MRN: [id number]  Admit Date:  2/7/2018      Assessment Date:  2/8/2018       Most Recent Value    General Information      Referral From:  MD [place]    Height  182.9 cm (72\")    Weight  94.8 kg (209 lb)    Weight Method  Standing scale    Are you currently involved in an activity/exercise program?   No    Do you have high blood pressure?  yes    Pregnancy Assessment     Diabetes History     What type of diabetes do you have?  Type 2    Length of Diabetes Diagnosis  10 + years    Current DM knowledge  good    Do you test your blood sugar at home?  yes    Meter type  [employer]    Who performs the test?  Patient     Typical readings  109-129    Have you had low blood sugar? (<70mg/dl)  no    How would you rate your diabetes control?  good    Education Preferences     Nutrition Information     Assessment Topics     Healthy Eating - Assessment  Competent    Being Active - Assessment  Needs education    Taking Medication - Assessment  Competent    Problem Solving - Assessment  Competent    Reducing Risk - Assessment  Competent    Healthy Coping - Assessment  Competent    Monitoring - Assessment  Competent    DM Goals                Most Recent Value    DM Education Needs     Meter  Has own    Meter Type  -- [[employer]]    Frequency of Testing  4 times a day    Blood Glucose Target Range   per ADA recommendations    Medication  Oral, Insulin, Side effects    Problem Solving  Hypoglycemia, Hyperglycemia, Sick days, Signs, Symptoms, Treatment    Reducing Risks  A1C testing    Physical Activity  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 Target blood glucose readings and A1c goals per ADA were reviewed. Reviewed with patient current A1c of 7.0% and discussed its significance. Signs, symptoms and treatment of hyperglycemia and hypoglycemia were discussed. Lifestyle changes such as physical activity with MD approval and healthy eating were encouraged. Stressed the importance of strict blood sugar control after surgery to prevent complications such as infection.   Pt instructed to  check blood sugar 4 times per day and to call PCP if  Blood glucose is higher than 180 two times or more.  Patient was encouraged to keep record of blood glucose readings to take to follow up appointment with PCP.  Pt encouraged to have floor nurse contact diabetes education if any questions arise, pt verbalized understanding.        Electronically signed by:  Cailin Gardner RN  02/08/18 3:08 PM

## 2018-02-09 VITALS
TEMPERATURE: 97.6 F | DIASTOLIC BLOOD PRESSURE: 77 MMHG | WEIGHT: 209 LBS | RESPIRATION RATE: 18 BRPM | HEIGHT: 72 IN | SYSTOLIC BLOOD PRESSURE: 132 MMHG | BODY MASS INDEX: 28.31 KG/M2 | HEART RATE: 81 BPM | OXYGEN SATURATION: 98 %

## 2018-02-09 LAB — GLUCOSE BLDC GLUCOMTR-MCNC: 98 MG/DL (ref 70–130)

## 2018-02-09 PROCEDURE — A9270 NON-COVERED ITEM OR SERVICE: HCPCS | Performed by: NURSE PRACTITIONER

## 2018-02-09 PROCEDURE — 99224 PR SBSQ OBSERVATION CARE/DAY 15 MINUTES: CPT | Performed by: NURSE PRACTITIONER

## 2018-02-09 PROCEDURE — 63710000001 APIXABAN 5 MG TABLET: Performed by: NURSE PRACTITIONER

## 2018-02-09 PROCEDURE — 63710000001 VITAMIN B-12 1000 MCG TABLET: Performed by: NURSE PRACTITIONER

## 2018-02-09 PROCEDURE — 93010 ELECTROCARDIOGRAM REPORT: CPT | Performed by: INTERNAL MEDICINE

## 2018-02-09 PROCEDURE — 63710000001 LOSARTAN 50 MG TABLET: Performed by: INTERNAL MEDICINE

## 2018-02-09 PROCEDURE — G0378 HOSPITAL OBSERVATION PER HR: HCPCS

## 2018-02-09 PROCEDURE — 63710000001 PANTOPRAZOLE 40 MG TABLET DELAYED-RELEASE: Performed by: NURSE PRACTITIONER

## 2018-02-09 PROCEDURE — 63710000001 ASPIRIN 81 MG TABLET DELAYED-RELEASE: Performed by: NURSE PRACTITIONER

## 2018-02-09 PROCEDURE — 82962 GLUCOSE BLOOD TEST: CPT

## 2018-02-09 PROCEDURE — A9270 NON-COVERED ITEM OR SERVICE: HCPCS | Performed by: INTERNAL MEDICINE

## 2018-02-09 PROCEDURE — 93005 ELECTROCARDIOGRAM TRACING: CPT | Performed by: INTERNAL MEDICINE

## 2018-02-09 PROCEDURE — 99217 PR OBSERVATION CARE DISCHARGE MANAGEMENT: CPT | Performed by: INTERNAL MEDICINE

## 2018-02-09 RX ORDER — LOSARTAN POTASSIUM 100 MG/1
100 TABLET ORAL DAILY
Qty: 30 TABLET | Refills: 6 | Status: SHIPPED | OUTPATIENT
Start: 2018-02-09 | End: 2018-06-01 | Stop reason: DRUGHIGH

## 2018-02-09 RX ORDER — LOSARTAN POTASSIUM 50 MG/1
100 TABLET ORAL
Status: DISCONTINUED | OUTPATIENT
Start: 2018-02-09 | End: 2018-02-09 | Stop reason: HOSPADM

## 2018-02-09 RX ADMIN — ASPIRIN 81 MG: 81 TABLET, COATED ORAL at 09:06

## 2018-02-09 RX ADMIN — APIXABAN 5 MG: 5 TABLET, FILM COATED ORAL at 09:05

## 2018-02-09 RX ADMIN — LOSARTAN POTASSIUM 100 MG: 50 TABLET ORAL at 09:11

## 2018-02-09 RX ADMIN — CYANOCOBALAMIN TAB 1000 MCG 1000 MCG: 1000 TAB at 09:06

## 2018-02-09 RX ADMIN — PANTOPRAZOLE SODIUM 40 MG: 40 TABLET, DELAYED RELEASE ORAL at 06:15

## 2018-02-09 NOTE — NURSING NOTE
Patient Name:  Juan Coe  :  1935  DOS:  18    Hospital Problem List     PAF (paroxysmal atrial fibrillation)    Diabetes mellitus    Hyperlipidemia    HTN (hypertension)    Coronary artery disease           Medical records have been reviewed. Patient admitted with symptomatic aflutter s/p RFA.  Patient is a good candidate for the [facility].  Education provided.  Education time 10 min.  Patient to be scheduled follow up 3-4 weeks post discharge.    Echo Results:  · Left ventricular systolic function is normal. Estimated EF = 65%.  · Left ventricular wall thickness is consistent with asymmetric hypertrophy.  · Moderate mitral valve regurgitation is present  · Mild to moderate tricuspid valve regurgitation is present.  · there is heavy calcification of the aortic valve with reduced excusion and a gradient c/w mild AS        Atrial fibrillation / Atrial flutter:  Quality Measure    CHADS-VASc Score: 5    Anticoagulation for CHADS-VASc >/=2    Yes      Statin Therapy (for patients with a history of CAD, CVA/TIA, PVA, DM)  Yes      Atrial fibrillation / Atrial flutter education:   Signs / symptoms and Role of [facility] and when to call

## 2018-02-09 NOTE — PROGRESS NOTES
[facility] Medicine Services  PROGRESS NOTE    Patient Name: Juan Coe  : 1935  MRN: [id number]    Date of Admission: 2018  Length of Stay: 0  Primary Care Physician: Connor Rico MD    Subjective   Subjective     CC:  Hospital follow up for glucose management.    HPI:  Patient resting in bed, fully dressed with family at bedside.  Going home today per primary service.      Review of Systems  Gen- No fevers, chills  CV- No chest pain, palpitations  Resp- No cough, dyspnea  GI- No N/V/D, abd pain      Otherwise ROS is negative except as mentioned in the HPI.    Objective   Objective     Vital Signs:   Temp:  [97.6 °F (36.4 °C)-97.9 °F (36.6 °C)] 97.6 °F (36.4 °C)  Heart Rate:  [75-93] 81  Resp:  [10-20] 18  BP: (105-140)/(60-86) 132/77        Physical Exam:    Constitutional: No acute distress, awake, alert, up in bed with family at bedside.   Eyes: PERRLA, sclerae anicteric, no conjunctival injection  HENT: NCAT, mucous membranes moist  Neck: Supple, no thyromegaly, no lymphadenopathy, trachea midline  Respiratory: Clear to auscultation bilaterally, nonlabored respirations on room air  Cardiovascular: irregular rate and rhythm , + murmurs, palpable pedal pulses bilaterally  Gastrointestinal: Positive bowel sounds, soft, nontender, nondistended  Musculoskeletal: No bilateral ankle edema, no clubbing or cyanosis to extremities  Psychiatric: Appropriate affect, cooperative  Neurologic: Oriented x 3, strength symmetric in all extremities, Cranial Nerves grossly intact to confrontation, speech clear  Skin: No rashes    Results Reviewed:  I have personally reviewed current lab, radiology, and data and agree.          Results from last 7 days  Lab Units 18  0546   SODIUM mmol/L 137   POTASSIUM mmol/L 4.4   CHLORIDE mmol/L 108   CO2 mmol/L 21.0   BUN mg/dL 28*   CREATININE mg/dL 1.40*   GLUCOSE mg/dL 105*   CALCIUM mg/dL 9.1     Estimated Creatinine Clearance: 50.3  mL/min (by C-G formula based on Cr of 1.4).  BNP   Date Value Ref Range Status   02/08/2018 56.0 0.0 - 100.0 pg/mL Final     Comment:     Results may be falsely decreased if patient taking Biotin.     No results found for: PHART    Microbiology Results Abnormal     None          Imaging Results (last 24 hours)     ** No results found for the last 24 hours. **        Results for orders placed during the hospital encounter of 02/07/18   Adult Transthoracic Echo Complete W/ Cont if Necessary Per Protocol    Narrative · Left ventricular systolic function is normal. Estimated EF = 65%.  · Left ventricular wall thickness is consistent with asymmetric   hypertrophy.  · Moderate mitral valve regurgitation is present  · Mild to moderate tricuspid valve regurgitation is present.  · there is heavy calcification of the aortic valve with reduced excusion   and a gradient c/w mild AS          I have reviewed the medications.    Assessment/Plan   Assessment / Plan     Hospital Problem List     PAF (paroxysmal atrial fibrillation)    Diabetes mellitus    Hyperlipidemia    HTN (hypertension)    Coronary artery disease             Brief Hospital Course to date:  Juan Coe is a 82 y.o. male with a significant cardiac history who presented as a transfer from [facility] with paroxysmal atrial fibrillation/flutter and consideration for a possible ablation.  He has a long history of type 2 diabetes and Hospital medicine has been consulted for diabetes management.  He has been diabetic for at least 15 years and follows with an endocrinologist here in [place].  He states that his last A1C was 6.8%.        Assessment & Plan:  Type 2 Diabetes-  -- hgb A1C 7.0%   -- has been seen by diabetes education this admission  --I reviewed the patient's current home med list.  His home diabetes regimen includes:       -Lantus 75 units Q HS       -metformin 1000 mg BID       -actos 15 mg daily  --He is being discharged by primary  service. He should go home on his home regimen.  He was instructed to monitor his glucoses closely and follow up with his endocrinologist.    --We discussed his actos.  He has no diagnosis or evidence of heart failure.  He should continue this medication for now and discuss it with his endocrinologist at his follow up appointment.         DVT Prophylaxis:  Per primary service    CODE STATUS: Prior    Disposition: I expect the patient to be discharged home today.     Promise Henry, DANGELO  02/09/18  4:33 PM

## 2018-02-09 NOTE — PROGRESS NOTES
"[facility]  Progress Note       LOS: 0 days   Patient Care Team:  Connor Rico MD as PCP - General (Internal Medicine)  No Known Provider as PCP - Family Medicine    Chief Complaint:  Atrial flutter     Subjective     No complaints of CP or SOB. Complains that he feels \"crampy\" and has had diarrhea this AM.       Review of Systems:   Pertinent positives in HPI, all others reviewed and negative.      Objective       Current Facility-Administered Medications:   •  acetaminophen (TYLENOL) tablet 650 mg, 650 mg, Oral, Q4H PRN, 650 mg at 02/08/18 2213 **OR** acetaminophen (TYLENOL) 160 MG/5ML solution 650 mg, 650 mg, Oral, Q4H PRN **OR** acetaminophen (TYLENOL) suppository 650 mg, 650 mg, Rectal, Q4H PRN, David Guillen MD  •  apixaban (ELIQUIS) tablet 5 mg, 5 mg, Oral, Q12H, Susan Mcmullen APRN, 5 mg at 02/08/18 2213  •  aspirin EC tablet 81 mg, 81 mg, Oral, Daily, Susan Mcmullen APRN, 81 mg at 02/08/18 0854  •  atorvastatin (LIPITOR) tablet 10 mg, 10 mg, Oral, Nightly, Susan Mcmullen APRN, 10 mg at 02/08/18 2213  •  calcium gluconate injection 1 g, 1 g, Intravenous, Once, David Guillen MD  •  dextrose (D50W) solution 25 g, 25 g, Intravenous, Q15 Min PRN, Promise [place], APRN  •  dextrose (GLUTOSE) oral gel 15 g, 15 g, Oral, Q15 Min PRN, Promise Elaine, APRN  •  glucagon (human recombinant) (GLUCAGEN DIAGNOSTIC) injection 1 mg, 1 mg, Subcutaneous, PRN, Promise [place], APRN  •  HYDROcodone-acetaminophen (NORCO) 5-325 MG per tablet 1 tablet, 1 tablet, Oral, Q4H PRN, David Guillen MD  •  insulin detemir (LEVEMIR) injection 10 Units, 10 Units, Subcutaneous, Nightly, Promise [place], APRN, 10 Units at 02/08/18 2210  •  insulin lispro (humaLOG) injection 0-7 Units, 0-7 Units, Subcutaneous, 4x Daily With Meals & Nightly, DANGELO Malone  •  losartan (COZAAR) tablet 50 mg, 50 mg, Oral, Q24H, DANGELO Jaramillo, 50 mg at 02/08/18 0854  •  nitroglycerin (NITROSTAT) SL tablet " "0.4 mg, 0.4 mg, Sublingual, Q5 Min PRN, DANGELO Jaramillo  •  ondansetron (ZOFRAN) injection 4 mg, 4 mg, Intravenous, Q6H PRN, David Guillen MD  •  pantoprazole (PROTONIX) EC tablet 40 mg, 40 mg, Oral, QAM, Susan Mcmullen APRN, 40 mg at 02/09/18 0615  •  tamsulosin (FLOMAX) 24 hr capsule 0.4 mg, 0.4 mg, Oral, Nightly, Susan Mcmullen APRN, 0.4 mg at 02/08/18 2213  •  vitamin B-12 (CYANOCOBALAMIN) tablet 1,000 mcg, 1,000 mcg, Oral, Daily, DANGELO Jaramillo, 1,000 mcg at 02/08/18 0854    Vital Sign Min/Max for last 24 hours  Temp  Min: 97.7 °F (36.5 °C)  Max: 97.9 °F (36.6 °C)   BP  Min: 103/63  Max: 130/77   Pulse  Min: 75  Max: 93   Resp  Min: 10  Max: 20   SpO2  Min: 93 %  Max: 98 %   Flow (L/min)  Min: 2  Max: 2   Weight  Min: 94.8 kg (209 lb)  Max: 94.8 kg (209 lb)     Flowsheet Rows         First Filed Value    Admission Height  182.9 cm (72\") Documented at 02/07/2018 1706    Admission Weight  94.8 kg (209 lb) Documented at 02/07/2018 1706            Intake/Output Summary (Last 24 hours) at 02/09/18 0807  Last data filed at 02/08/18 2200   Gross per 24 hour   Intake              360 ml   Output              250 ml   Net              110 ml       Physical Exam:     General Appearance:    Alert, cooperative, in no acute distress   Lungs:     Clear to auscultation,respirations regular, even and                  unlabored    Heart:    RRR, S1 and S2, +AS murmur, no gallop, no rub, no click   Chest Wall:    No abnormalities observed   Abdomen:     Normal bowel sounds, no masses, no organomegaly, soft        non-tender, non-distended, no guarding, no rebound                tenderness   Extremities:   Moves all extremities well, no edema, no cyanosis, no             Redness. Right IJ site and right groin sites clean, dry, no hematoma   Pulses:   Pulses palpable and equal bilaterally   Skin:   No bleeding, bruising or rash        Results Review:         Results from last 7 days  Lab Units 02/08/18  0546 "   SODIUM mmol/L 137   POTASSIUM mmol/L 4.4   CHLORIDE mmol/L 108   CO2 mmol/L 21.0   BUN mg/dL 28*   CREATININE mg/dL 1.40*   GLUCOSE mg/dL 105*        Results from last 7 days  Lab Units 02/08/18  0546   HEMOGLOBIN A1C % 7.00*       Results from last 7 days  Lab Units 02/08/18  0546   CHOLESTEROL mg/dL 124   TRIGLYCERIDES mg/dL 128   HDL CHOL mg/dL 33*   LDL CHOL mg/dL 79                       Intake/Output Summary (Last 24 hours) at 02/09/18 0807  Last data filed at 02/08/18 2200   Gross per 24 hour   Intake              360 ml   Output              250 ml   Net              110 ml       I personally viewed and interpreted the patient's EKG/Telemetry data    EKG: NSR 78 bpm with RBBB, LAD, normal MA interval    Telemetry: NSR   - overnight, had 2.5 second sinus pauses, asymptomatic.     Ejection Fraction  No results found for: EF    Echo EF Estimated  Lab Results   Component Value Date    ECHOEFEST 65 02/08/2018         Present on Admission:  • PAF (paroxysmal atrial fibrillation)    Assessment/Plan   1. Typical Atrial flutter - symptomatic in nature with shoulder pain/neck pain and overall poor feeling. S/P radiofrequency ablation of right atrial isthmus dependent flutter.  CHADSvasc = 4 on Eliquis Clinically improved  2. Sinus Node Dysfunction - noted on EPS, had asymptomatic pauses overnight. Now off CCB and BB. Continue to monitor. May need Event Monitor as outpatient. And may require PM in future if symptomatic.    2. CAD - recent NSTEMI with [facility] - medical management - on ASA, statin, ARB. No BB for now due to SSS  3. LV dysfunction - 45% 1/2018 at time of NSTEMI. Repeat Echo here shows EF 65% 2/8/18  4. Renal Insufficiency - Cr 1.4. Recheck as outpatient on Monday.   5. VHD - moderate MR on echo, mild to moderate TR, heavy AV calcification with mild AS  6. Diarrhea - monitor. Not been on antibiotics recently. Low suspicion for Cdiff.     Plan for disposition: The patient is stable and will be discharged to  home with plan to follow up with Dr. Powers in 3 months.       NANCY Quiroz  02/09/18  8:07 AM      I, David Guillen MD, personally performed the services face to face as described and documented by the above named individual. I have made any necessary edits and it is both accurate and complete 2/9/2018  8:36 AM

## 2018-02-09 NOTE — PROGRESS NOTES
Continued Stay Note  [facility]     Patient Name: Juan Coe  MRN: [id number]  Today's Date: 2/9/2018    Admit Date: 2/7/2018          Discharge Plan       02/09/18 0924    Case Management/Social Work Plan    Plan Home    Patient/Family In Agreement With Plan yes    Additional Comments Spoke with pt at bedside. Pt declines needing home health at this time. Pt reports he is going to discharge home and had no discharge needs or concerns.     Final Note    Final Note SW is following              Discharge Codes     None        Expected Discharge Date and Time     Expected Discharge Date Expected Discharge Time    Feb 9, 2018             ALCIDES Smith

## 2018-02-12 ENCOUNTER — LAB REQUISITION (OUTPATIENT)
Dept: LAB | Facility: HOSPITAL | Age: 83
End: 2018-02-12

## 2018-02-12 DIAGNOSIS — Z00.00 ROUTINE GENERAL MEDICAL EXAMINATION AT A HEALTH CARE FACILITY: ICD-10-CM

## 2018-02-12 LAB
ANION GAP SERPL CALCULATED.3IONS-SCNC: 9 MMOL/L (ref 3–11)
BUN BLD-MCNC: 16 MG/DL (ref 9–23)
BUN/CREAT SERPL: 11.4 (ref 7–25)
CALCIUM SPEC-SCNC: 9.4 MG/DL (ref 8.7–10.4)
CHLORIDE SERPL-SCNC: 109 MMOL/L (ref 99–109)
CO2 SERPL-SCNC: 21 MMOL/L (ref 20–31)
CREAT BLD-MCNC: 1.4 MG/DL (ref 0.6–1.3)
GFR SERPL CREATININE-BSD FRML MDRD: 49 ML/MIN/1.73
GLUCOSE BLD-MCNC: 194 MG/DL (ref 70–100)
POTASSIUM BLD-SCNC: 4.3 MMOL/L (ref 3.5–5.5)
SODIUM BLD-SCNC: 139 MMOL/L (ref 132–146)

## 2018-02-12 PROCEDURE — 80048 BASIC METABOLIC PNL TOTAL CA: CPT | Performed by: PHYSICIAN ASSISTANT

## 2018-02-13 LAB
BUN SERPL-MCNC: 16 MG/DL (ref 9–23)
BUN/CREAT SERPL: 11.4 (ref 7–25)
CHLORIDE SERPL-SCNC: 109 MMOL/L (ref 99–109)
CO2 SERPL-SCNC: 21 MMOL/L (ref 20–31)
CREAT SERPL-MCNC: 1.4 MG/DL (ref 0.6–1.3)
GFR SERPLBLD CREATININE-BSD FMLA CKD-EPI: 49 ML/MIN/1.732
GLUCOSE SERPL-MCNC: 194 MG/DL (ref 70–100)
POTASSIUM SERPL-SCNC: 4.3 MMOL/L (ref 3.5–5.5)
SODIUM SERPL-SCNC: 139 MMOL/L (ref 132–146)

## 2018-03-19 ENCOUNTER — HOSPITAL ENCOUNTER (OUTPATIENT)
Dept: CARDIOLOGY | Facility: HOSPITAL | Age: 83
Discharge: HOME | End: 2018-03-19
Attending: NURSE PRACTITIONER | Admitting: NURSE PRACTITIONER

## 2018-03-19 ENCOUNTER — OFFICE VISIT (OUTPATIENT)
Dept: CARDIOLOGY | Facility: HOSPITAL | Age: 83
End: 2018-03-19

## 2018-03-19 VITALS
HEART RATE: 91 BPM | WEIGHT: 212 LBS | TEMPERATURE: 97.7 F | BODY MASS INDEX: 28.71 KG/M2 | RESPIRATION RATE: 16 BRPM | HEIGHT: 72 IN | DIASTOLIC BLOOD PRESSURE: 85 MMHG | OXYGEN SATURATION: 98 % | SYSTOLIC BLOOD PRESSURE: 142 MMHG

## 2018-03-19 DIAGNOSIS — I48.0 PAF (PAROXYSMAL ATRIAL FIBRILLATION) (HCC): ICD-10-CM

## 2018-03-19 DIAGNOSIS — I25.810 CORONARY ARTERY DISEASE INVOLVING CORONARY BYPASS GRAFT OF NATIVE HEART WITHOUT ANGINA PECTORIS: ICD-10-CM

## 2018-03-19 DIAGNOSIS — I48.3 TYPICAL ATRIAL FLUTTER (HCC): Primary | ICD-10-CM

## 2018-03-19 DIAGNOSIS — I10 ESSENTIAL HYPERTENSION: ICD-10-CM

## 2018-03-19 PROCEDURE — 99214 OFFICE O/P EST MOD 30 MIN: CPT | Performed by: NURSE PRACTITIONER

## 2018-03-19 PROCEDURE — 93005 ELECTROCARDIOGRAM TRACING: CPT | Performed by: NURSE PRACTITIONER

## 2018-03-19 PROCEDURE — 93010 ELECTROCARDIOGRAM REPORT: CPT | Performed by: INTERNAL MEDICINE

## 2018-03-19 NOTE — PROGRESS NOTES
"Encounter Date:03/19/2018      Patient ID: Juan Coe is a 82 y.o. male.        Subjective:     Chief Complaint: Establish Care (1 month post ablation)     History of Present Illness  Mr. Coe is an 83 YO M with history of CAD s/p CABG, HTN, HLP, DM, and symptomatic paroxysmal atrial fibrillation/flutter here to follow up after RFA of atrial flutter on 2/8/18. Patient presented to the hospital with symptomatic typical atrial flutter s/p successful RFA without complication. He is doing very well. Patient denies chest pain, chest pressure, palpitations, tachycardia, dyspnea, presyncope, syncope, orthopnea, PND, abdominal fullness, early satiety, claudication, cough or edema. He does say he has become rather sedentary since procedure, but he says this is due to \"laziness\" rather than fatigue. He brings in log of BP and HR and SBP is around 100-150 with most BPs being 130-140s, most HRs are 60-80s with a couple of them being in the low 100s which he says is during a basketball game.     Patient Active Problem List   Diagnosis   • PAF (paroxysmal atrial fibrillation)   • Diabetes mellitus   • Hyperlipidemia   • HTN (hypertension)   • Coronary artery disease   • Typical atrial flutter         Past Surgical History:   Procedure Laterality Date   • CARDIAC ELECTROPHYSIOLOGY PROCEDURE N/A 2/8/2018    Procedure: EP/Ablation;  Surgeon: David Guillen MD;  Location: [facility] INVASIVE LOCATION;  Service:    • CATARACT EXTRACTION     • COLON RESECTION     • CORONARY ANGIOPLASTY WITH STENT PLACEMENT     • CORONARY ARTERY BYPASS GRAFT      \"6 GRAFTS\"   • GALLBLADDER SURGERY     • TOTAL HIP ARTHROPLASTY Right        Allergies   Allergen Reactions   • Phenergan [Promethazine Hcl] Delirium         Current Outpatient Prescriptions:   •  apixaban (ELIQUIS) 5 MG tablet tablet, Take 5 mg by mouth Every 12 (Twelve) Hours., Disp: , Rfl:   •  aspirin 81 MG EC tablet, Take 81 mg by mouth Daily., Disp: , Rfl:   •  EZETIMIBE PO, " Take 5 mg by mouth Daily., Disp: , Rfl:   •  insulin glargine (LANTUS) 100 UNIT/ML injection, Inject 75 Units under the skin Every Night., Disp: , Rfl:   •  losartan (COZAAR) 100 MG tablet, Take 1 tablet by mouth Daily., Disp: 30 tablet, Rfl: 6  •  metFORMIN (GLUCOPHAGE) 1000 MG tablet, Take 1,000 mg by mouth 2 (Two) Times a Day With Meals., Disp: , Rfl:   •  nitroglycerin (NITROSTAT) 0.4 MG SL tablet, Place 0.4 mg under the tongue Every 5 (Five) Minutes As Needed for Chest Pain. Take no more than 3 doses in 15 minutes., Disp: , Rfl:   •  Omega 3 1000 MG capsule, Take 1 g by mouth., Disp: , Rfl:   •  omeprazole (priLOSEC) 20 MG capsule, Take 20 mg by mouth Daily., Disp: , Rfl:   •  pioglitazone (ACTOS) 15 MG tablet, Take 15 mg by mouth Daily., Disp: , Rfl:   •  simvastatin (ZOCOR) 20 MG tablet, Take 20 mg by mouth Every Night., Disp: , Rfl:   •  tamsulosin (FLOMAX) 0.4 MG capsule 24 hr capsule, Take 1 capsule by mouth Every Night., Disp: , Rfl:   •  vitamin B-12 (CYANOCOBALAMIN) 1000 MCG tablet, Take 1,000 mcg by mouth Daily., Disp: , Rfl:     The following portions of the chart were reviewed and updated as appropriate: Allergies, current medications, past family history, social history, past medical history.     Review of Systems   Constitution: Negative for chills, decreased appetite, diaphoresis, fever, weakness, malaise/fatigue, night sweats, weight gain and weight loss.   HENT: Negative for congestion, hearing loss, hoarse voice and nosebleeds.    Eyes: Negative for blurred vision, visual disturbance and visual halos.   Cardiovascular: Negative for chest pain, claudication, cyanosis, dyspnea on exertion, irregular heartbeat, leg swelling, near-syncope, orthopnea, palpitations, paroxysmal nocturnal dyspnea and syncope.   Respiratory: Positive for cough. Negative for hemoptysis, shortness of breath, sleep disturbances due to breathing, snoring, sputum production and wheezing.    Hematologic/Lymphatic: Negative  "for bleeding problem. Does not bruise/bleed easily.   Skin: Negative for dry skin, itching and rash.   Musculoskeletal: Negative for arthritis, joint pain, joint swelling and myalgias.   Gastrointestinal: Positive for bloating, diarrhea and heartburn. Negative for abdominal pain, constipation, flatus, hematemesis, hematochezia, melena, nausea and vomiting.   Genitourinary: Negative for dysuria, frequency, hematuria, nocturia and urgency.   Neurological: Negative for excessive daytime sleepiness, dizziness, headaches, light-headedness and loss of balance.   Psychiatric/Behavioral: Negative for depression. The patient does not have insomnia and is not nervous/anxious.            Objective:     Vitals:    03/19/18 1406 03/19/18 1407 03/19/18 1408   BP: 149/88 145/78 142/85   BP Location: Right arm Left arm Left arm   Patient Position: Sitting Sitting Standing   Pulse: 83  91   Resp: 16     Temp: 97.7 °F (36.5 °C)     TempSrc: Temporal Artery      SpO2: 98%     Weight: 96.2 kg (212 lb)     Height: 182.9 cm (72.01\")           Physical Exam   Constitutional: He is oriented to person, place, and time. He appears well-developed and well-nourished. No distress.   HENT:   Head: Normocephalic.   Eyes: Conjunctivae are normal. Pupils are equal, round, and reactive to light.   Neck: Neck supple. No JVD present. No thyromegaly present.   Cardiovascular: Normal rate, regular rhythm and intact distal pulses.  Exam reveals no gallop and no friction rub.    Murmur (Harsh systolic murmur, grade III/VI) heard.  Pulmonary/Chest: Effort normal and breath sounds normal. No respiratory distress. He has no wheezes. He has no rales. He exhibits no tenderness.   Abdominal: Soft. Bowel sounds are normal.   Musculoskeletal: Normal range of motion. He exhibits no edema.   Neurological: He is alert and oriented to person, place, and time.   Skin: Skin is warm and dry.   Psychiatric: He has a normal mood and affect. His behavior is normal. Thought " content normal.   Vitals reviewed.      Lab and Diagnostic Review:      Results for orders placed or performed in visit on 02/12/18   Basic Metabolic Panel (7) ([facility])   Result Value Ref Range    Glucose 194 (H) 70 - 100 mg/dL    BUN 16 9 - 23 mg/dL    Creatinine 1.40 (H) 0.60 - 1.30 mg/dL    eGFR Non African Am 49 mL/min/1.732    BUN/Creatinine Ratio 11.4 7.0 - 25.0    Sodium 139 132 - 146 mmol/L    Potassium 4.3 3.5 - 5.5 mmol/L    Chloride 109 99 - 109 mmol/L    Total CO2 21.0 20.0 - 31.0 mmol/L     Echo 2/8/18  · Left ventricular systolic function is normal. Estimated EF = 65%.  · Left ventricular wall thickness is consistent with asymmetric hypertrophy.  · Moderate mitral valve regurgitation is present  · Mild to moderate tricuspid valve regurgitation is present.  · there is heavy calcification of the aortic valve with reduced excusion and a gradient c/w mild AS      Assessment and Plan:         1. Typical atrial flutter  - s/p RFA for aflutter. Maintaining NSR  - ChadsVasc score 5 on chronic eliquis without s/s of bleeding  - Sinus node dysfunction noted on EPS, now off AVN blocking agents  - Atrial fibrillation/flutter education provided today including: causes of afib, s/s, risks for stroke and use of anticoagulation for stroke prevention and treatment of atrial fibrillation. Discussed triggers  - His wife has reported that he has symptoms of sleep apnea but he has been reluctant to undergo sleep evaluation. Recommend that he have sleep study. He says he will discuss with his primary cardiologist    2. PAF (paroxysmal atrial fibrillation)  - Maintaining NSR  - Chronic anticoagulation with Eliquis  - ECG 12 Lead; Future    3. Essential hypertension  - Controlled  - HTN Education provided today including signs and symptoms, medication management, daily blood pressure monitoring.  Patient encouraged to call the [facility] with any blood pressure consistently greater than 130/80  - Encouraged  patient to increase physical activity      4. Coronary artery disease involving coronary bypass graft of native heart without angina pectoris  - Stable without angina  - Followed by Dr. Cisneros's group    It has been a pleasure to participate in the care of this patient.  Patient was instructed to call the [facility] with any questions, concerns, or worsening symptoms.      * Please note that portions of this note were completed with a voice recognition program. Efforts were made to edit the dictation but occasionally words are transcribed.

## 2018-06-01 ENCOUNTER — OFFICE VISIT (OUTPATIENT)
Dept: CARDIOLOGY | Facility: CLINIC | Age: 83
End: 2018-06-01

## 2018-06-01 VITALS
HEART RATE: 68 BPM | HEIGHT: 72 IN | BODY MASS INDEX: 27.79 KG/M2 | WEIGHT: 205.2 LBS | SYSTOLIC BLOOD PRESSURE: 126 MMHG | DIASTOLIC BLOOD PRESSURE: 68 MMHG

## 2018-06-01 DIAGNOSIS — I48.3 TYPICAL ATRIAL FLUTTER (HCC): Primary | ICD-10-CM

## 2018-06-01 DIAGNOSIS — I10 ESSENTIAL HYPERTENSION: ICD-10-CM

## 2018-06-01 DIAGNOSIS — I25.810 CORONARY ARTERY DISEASE INVOLVING CORONARY BYPASS GRAFT OF NATIVE HEART WITHOUT ANGINA PECTORIS: ICD-10-CM

## 2018-06-01 PROCEDURE — 93000 ELECTROCARDIOGRAM COMPLETE: CPT | Performed by: INTERNAL MEDICINE

## 2018-06-01 PROCEDURE — 99213 OFFICE O/P EST LOW 20 MIN: CPT | Performed by: INTERNAL MEDICINE

## 2018-06-01 RX ORDER — CYCLOBENZAPRINE HCL 5 MG
5 TABLET ORAL NIGHTLY PRN
COMMUNITY

## 2018-06-01 RX ORDER — LOSARTAN POTASSIUM 50 MG/1
50 TABLET ORAL 2 TIMES DAILY
COMMUNITY

## 2018-06-01 NOTE — PROGRESS NOTES
Juan Coe  1935  [phone]      06/01/2018    [facility] CARDIOLOGY     Connor Rico MD  [address] [place] [address]    Chief Complaint   Patient presents with   • Atrial Fibrillation     Problem List:  1. Paroxysmal atrial fibrillation/palpitations  A. Remote SVT with adenosine administration approximately 1994-data deficit  B. Defibrillator shock required during left heart catheterization 2005  C. Atrial fibrillation at time of MI 1/22/18, patient started on Eliquis, CHADsVasc 4  D. Flutter RFA 2/18  2. Ischemic heart disease  A.  MI approximately 2005, with left heart catheterization, followed by CABG ×6 vessels-data deficit  B. Apparent defibrillator shock during left heart catheterization in 2005  C. NSTEMI 1/22/18 at [facility] with left heart catheterization 1/22/18: CAD with occluded left main and right coronary artery at the ostium, LIMA graft to the LAD and diagonal is patent, saphenous yohan graft to the obtuse marginal and diagonal 2 is patent, saphenous vein graft to the distal LAD occluded, with discharge with medical therapy  D. Residual Class I symptoms  3. Hypertension  4. Hyperlipidemia  5. Type 2 diabetes mellitus  6. GERD  7. History of colon cancer  8. Surgical history  A. CABG ×6 vessels 2005  B. Right hip replacement  C. Colon resection  D. Cholecystectomy  E. Cataract extractions     Allergies  Allergies   Allergen Reactions   • Phenergan [Promethazine Hcl] Delirium       Current Medications    Current Outpatient Prescriptions:   •  apixaban (ELIQUIS) 5 MG tablet tablet, Take 5 mg by mouth Every 12 (Twelve) Hours., Disp: , Rfl:   •  aspirin 81 MG EC tablet, Take 81 mg by mouth Daily., Disp: , Rfl:   •  cyclobenzaprine (FLEXERIL) 5 MG tablet, Take 5 mg by mouth At Night As Needed for Muscle Spasms. 1/2-1 tab nightly PRN, Disp: , Rfl:   •  EZETIMIBE PO, Take 5 mg by mouth Daily., Disp: , Rfl:   •  insulin  "glargine (LANTUS) 100 UNIT/ML injection, Inject 50 Units under the skin Every Night., Disp: , Rfl:   •  losartan (COZAAR) 50 MG tablet, Take 50 mg by mouth 2 (Two) Times a Day., Disp: , Rfl:   •  Memantine HCl-Donepezil HCl (NAMZARIC) 28-10 MG capsule sustained-release 24 hr, Take 1 tablet by mouth Daily., Disp: , Rfl:   •  metFORMIN (GLUCOPHAGE) 1000 MG tablet, Take 1,000 mg by mouth 2 (Two) Times a Day With Meals., Disp: , Rfl:   •  nitroglycerin (NITROSTAT) 0.4 MG SL tablet, Place 0.4 mg under the tongue Every 5 (Five) Minutes As Needed for Chest Pain. Take no more than 3 doses in 15 minutes., Disp: , Rfl:   •  Omega 3 1000 MG capsule, Take 1,000 mg by mouth Daily., Disp: , Rfl:   •  omeprazole (priLOSEC) 20 MG capsule, Take 20 mg by mouth Daily., Disp: , Rfl:   •  simvastatin (ZOCOR) 20 MG tablet, Take 20 mg by mouth Every Night., Disp: , Rfl:   •  tamsulosin (FLOMAX) 0.4 MG capsule 24 hr capsule, Take 1 capsule by mouth Every Night., Disp: , Rfl:   •  vitamin B-12 (CYANOCOBALAMIN) 1000 MCG tablet, Take 1,000 mcg by mouth Daily., Disp: , Rfl:     History of Present Illness   HPI    Pt presents for follow up of Afib, Flutter, and HTN.  Since we last saw the pt, pt denies any AF episodes, SOB, CP, LH, and dizziness. Denies any hospitalizations, ER visits, bleeding, or TIA/CVA symptoms. Overall feels well. He is having some ringing in the ears otherwise feels ok.  BP at home has been stable. I reviewed the BP logs with pt.    ROS:  General:  + mild fatigue, No weight gain or loss  Cardiovascular:  Denies CP, PND, syncope, near syncope, edema or palpitations.  Pulmonary:  + mild chronic HORAN, No cough, or wheezing      Vitals:    06/01/18 1101   BP: 126/68   BP Location: Left arm   Patient Position: Sitting   Pulse: 68   Weight: 93.1 kg (205 lb 3.2 oz)   Height: 182.9 cm (72\")     Body mass index is 27.83 kg/m².  PE:  General: NAD  Neck: no JVD, no carotid bruits, no TM  Heart RRR, NL S1, S2, S4 present, no rubs, " murmurs  Lungs: CTA, no wheezes, rhonchi, or rales  Abd: soft, non-tender, NL BS  Ext: No musculoskeletal deformities, trace edema, cyanosis, or clubbing  Psych: normal mood and affect    Diagnostic Data:  IMPRESSION:  1. No evidence of left atrial thrombus by intracardiac ultrasound         2.     Successful radiofrequency ablation of right atrial isthmus dependent flutter with bidirectional isthmus block post-ablation.           3.     Sinus node dysfunction in a patient with atrial flutter and on high dose calcium channel blockers as well as beta blockers.   RECOMMENDATIONS:  1. The patient will be monitored on telemetry.  Monitor heart rates and sinus node function off calcium channel blockers and beta blockers.   Rissa Guillen MD  02/08/18  6:05 PM         ECG 12 Lead  Date/Time: 6/1/2018 11:51 AM  Performed by: RISSA GUILLEN  Authorized by: RISSA GUILLEN   Comparison: compared with previous ECG from 3/19/2018  Comparison to previous ECG: No change  Rhythm: sinus rhythm  BPM: 68  Conduction: complete RBBB            1. Typical atrial flutter    2. Essential hypertension    3. Coronary artery disease involving coronary bypass graft of native heart without angina pectoris        Plan:  1) AFL s/p RFA: No recurrence doing well  Continue present medications.   2) Anticoagulation  Continue NOAC/Eliquis  3) HTN: Controlled  4.)CAD: Followed by Dr. Cisneros.     Wt loss, exercise, salt reduction    F/up prn    Scribed for Rissa Guillen MD by Reggie Tellez PA-C. 6/1/2018  11:55 AM     I, Rissa Guillen MD, personally performed the services described in this documentation as scribed by the above named individual in my presence, and it is both accurate and complete.  6/1/2018  11:55 AM

## 2024-06-18 ENCOUNTER — LAB (OUTPATIENT)
Dept: FAMILY MEDICINE CLINIC | Facility: CLINIC | Age: 89
End: 2024-06-18
Payer: MEDICARE

## 2024-06-18 DIAGNOSIS — N18.31 CHRONIC KIDNEY DISEASE (CKD) STAGE G3A/A1, MODERATELY DECREASED GLOMERULAR FILTRATION RATE (GFR) BETWEEN 45-59 ML/MIN/1.73 SQUARE METER AND ALBUMINURIA CREATININE RATIO LESS THAN 30 MG/G (CMS/H*: Primary | ICD-10-CM

## 2024-06-18 LAB
HCT VFR BLD AUTO: 41.2 % (ref 37.5–51)
HGB BLD-MCNC: 13.3 G/DL (ref 13–17.7)

## 2024-06-18 PROCEDURE — 80069 RENAL FUNCTION PANEL: CPT | Performed by: INTERNAL MEDICINE

## 2024-06-18 PROCEDURE — 85014 HEMATOCRIT: CPT | Performed by: INTERNAL MEDICINE

## 2024-06-18 PROCEDURE — 36415 COLL VENOUS BLD VENIPUNCTURE: CPT | Performed by: FAMILY MEDICINE

## 2024-06-18 PROCEDURE — 85018 HEMOGLOBIN: CPT | Performed by: INTERNAL MEDICINE

## 2024-06-18 PROCEDURE — 82306 VITAMIN D 25 HYDROXY: CPT | Performed by: INTERNAL MEDICINE

## 2024-06-19 LAB
25(OH)D3 SERPL-MCNC: 66.7 NG/ML (ref 30–100)
ALBUMIN SERPL-MCNC: 4 G/DL (ref 3.5–5.2)
ANION GAP SERPL CALCULATED.3IONS-SCNC: 9.8 MMOL/L (ref 5–15)
BUN SERPL-MCNC: 29 MG/DL (ref 8–23)
BUN/CREAT SERPL: 20.3 (ref 7–25)
CALCIUM SPEC-SCNC: 9.9 MG/DL (ref 8.6–10.5)
CHLORIDE SERPL-SCNC: 106 MMOL/L (ref 98–107)
CO2 SERPL-SCNC: 23.2 MMOL/L (ref 22–29)
CREAT SERPL-MCNC: 1.43 MG/DL (ref 0.76–1.27)
EGFRCR SERPLBLD CKD-EPI 2021: 47.4 ML/MIN/1.73
GLUCOSE SERPL-MCNC: 277 MG/DL (ref 65–99)
PHOSPHATE SERPL-MCNC: 2 MG/DL (ref 2.5–4.5)
POTASSIUM SERPL-SCNC: 4.7 MMOL/L (ref 3.5–5.2)
SODIUM SERPL-SCNC: 139 MMOL/L (ref 136–145)
